# Patient Record
Sex: FEMALE | Race: WHITE | NOT HISPANIC OR LATINO | URBAN - METROPOLITAN AREA
[De-identification: names, ages, dates, MRNs, and addresses within clinical notes are randomized per-mention and may not be internally consistent; named-entity substitution may affect disease eponyms.]

---

## 2017-05-18 ENCOUNTER — OFFICE (OUTPATIENT)
Dept: URBAN - METROPOLITAN AREA CLINIC 101 | Facility: CLINIC | Age: 77
End: 2017-05-18
Payer: COMMERCIAL

## 2017-05-18 VITALS
HEART RATE: 101 BPM | SYSTOLIC BLOOD PRESSURE: 98 MMHG | WEIGHT: 117 LBS | TEMPERATURE: 97.7 F | DIASTOLIC BLOOD PRESSURE: 69 MMHG | HEIGHT: 63 IN

## 2017-05-18 DIAGNOSIS — Z86.010 PERSONAL HISTORY OF COLONIC POLYPS: ICD-10-CM

## 2017-05-18 DIAGNOSIS — K58.9 IRRITABLE BOWEL SYNDROME WITHOUT DIARRHEA: ICD-10-CM

## 2017-05-18 PROCEDURE — 99214 OFFICE O/P EST MOD 30 MIN: CPT

## 2017-05-18 NOTE — SERVICEHPINOTES
FUAD LANGSTON   is a   76   year old    female who is being seen in consultation at the request of   ROLAN DEVI   for IBS follow up.      She takes notriptyline 25mg qhs. Takes [employer] Labs daily for over a year now.  Has a BM daily, [place] stool scale type 4. Weight is stable. No blood or mucous in stools. Reports urgency with defecation. Works out at a gym on  regular basis except for when she fractured her left foot recently. Overall IBS symptoms are well controlled with the above regimen. Colonoscopy 2011-mild diverticulosis left and sigmoid colon, prior h/o polyps-repeat in 5 years.  Her   of colon cancer 10 years ago. No upper GI complaints present.